# Patient Record
Sex: MALE | Race: WHITE
[De-identification: names, ages, dates, MRNs, and addresses within clinical notes are randomized per-mention and may not be internally consistent; named-entity substitution may affect disease eponyms.]

---

## 2019-03-17 ENCOUNTER — HOSPITAL ENCOUNTER (EMERGENCY)
Dept: HOSPITAL 53 - M ED | Age: 54
Discharge: HOME | End: 2019-03-17
Payer: COMMERCIAL

## 2019-03-17 VITALS — HEIGHT: 72 IN | WEIGHT: 235.5 LBS | BODY MASS INDEX: 31.9 KG/M2

## 2019-03-17 VITALS — DIASTOLIC BLOOD PRESSURE: 91 MMHG | SYSTOLIC BLOOD PRESSURE: 149 MMHG

## 2019-03-17 DIAGNOSIS — Z79.899: ICD-10-CM

## 2019-03-17 DIAGNOSIS — Z87.891: ICD-10-CM

## 2019-03-17 DIAGNOSIS — R03.0: ICD-10-CM

## 2019-03-17 DIAGNOSIS — J09.X2: Primary | ICD-10-CM

## 2019-03-17 LAB
BUN SERPL-MCNC: 13 MG/DL (ref 7–18)
CALCIUM SERPL-MCNC: 8.2 MG/DL (ref 8.5–10.1)
CHLORIDE SERPL-SCNC: 107 MEQ/L (ref 98–107)
CO2 SERPL-SCNC: 28 MEQ/L (ref 21–32)
CREAT SERPL-MCNC: 0.77 MG/DL (ref 0.7–1.3)
FLUAV RNA UPPER RESP QL NAA+PROBE: POSITIVE
FLUBV RNA UPPER RESP QL NAA+PROBE: NEGATIVE
GFR SERPL CREATININE-BSD FRML MDRD: > 60 ML/MIN/{1.73_M2} (ref 56–?)
GLUCOSE SERPL-MCNC: 89 MG/DL (ref 70–100)
POTASSIUM SERPL-SCNC: 4.3 MEQ/L (ref 3.5–5.1)
SODIUM SERPL-SCNC: 140 MEQ/L (ref 136–145)

## 2019-03-18 NOTE — REP
UNILATERAL RIGHT RIBS, PA CHEST, FIVE VIEWS:

 

HISTORY:  Rib pain.

 

The lungs are clear.  The heart is normal in size.  The pulmonary vasculature is

normal in appearance.  The bony structure is intact.

 

IMPRESSION:

 

No acute disease.

 

 

Electronically Signed by

Srikanth Holland MD 03/18/2019 07:26 A

## 2021-05-11 ENCOUNTER — HOSPITAL ENCOUNTER (OUTPATIENT)
Dept: HOSPITAL 53 - M PLARAD | Age: 56
End: 2021-05-11
Attending: ORTHOPAEDIC SURGERY
Payer: COMMERCIAL

## 2021-05-11 DIAGNOSIS — M25.561: Primary | ICD-10-CM

## 2021-05-11 NOTE — REP
INDICATION:

PAIN IN RIGHT KNEE.



COMPARISON:

None.



TECHNIQUE:

Multiple sequences obtained in the axial, coronal and sagittal planes.



FINDINGS:

Menisci: Intact, no tear.

Cruciate ligaments: Intact.

Collateral ligaments: Intact.

Extensor mechanism/patellar retinacula: Intact.

Cartilage: There is a 1 cm cartilaginous defect of the medial patellar facet extending

close to the osseous surface.  There is mild diffuse chondromalacia in the lateral

joint compartment.  There is moderate diffuse chondromalacia of the medial joint

compartment.

Bone marrow: Diffuse marrow edema is seen in the medial femoral condyle consistent

with a bone bruise.  There is mild marrow edema medial patellar facet.

Joint fluid: There is mild to moderate joint effusion.

Popliteal region: No cyst.





IMPRESSION:

No evidence of internal derangement.  Diffuse bone bruise medial femoral condyle.

Mild to moderate joint effusion.



There is a 1 cm cartilaginous defect of the medial patellar facet extending close to

the osseous surface, with mild associated marrow edema.  There is mild diffuse

chondromalacia of the lateral joint compartment and moderate diffuse chondromalacia of

the medial joint compartment.





<Electronically signed by Jass Auguste > 05/11/21 1040

## 2022-12-22 ENCOUNTER — HOSPITAL ENCOUNTER (OUTPATIENT)
Dept: HOSPITAL 53 - M SDC | Age: 57
Discharge: HOME | End: 2022-12-22
Attending: DENTIST
Payer: COMMERCIAL

## 2022-12-22 VITALS — BODY MASS INDEX: 38.5 KG/M2 | WEIGHT: 275 LBS | HEIGHT: 71 IN

## 2022-12-22 VITALS — DIASTOLIC BLOOD PRESSURE: 84 MMHG | SYSTOLIC BLOOD PRESSURE: 140 MMHG

## 2022-12-22 DIAGNOSIS — F41.9: ICD-10-CM

## 2022-12-22 DIAGNOSIS — M54.2: ICD-10-CM

## 2022-12-22 DIAGNOSIS — F17.290: ICD-10-CM

## 2022-12-22 DIAGNOSIS — K02.9: Primary | ICD-10-CM

## 2022-12-22 DIAGNOSIS — Z79.899: ICD-10-CM

## 2022-12-22 DIAGNOSIS — I10: ICD-10-CM

## 2022-12-22 DIAGNOSIS — E78.00: ICD-10-CM

## 2022-12-22 DIAGNOSIS — M16.0: ICD-10-CM

## 2022-12-22 DIAGNOSIS — Z79.1: ICD-10-CM

## 2022-12-22 DIAGNOSIS — J45.909: ICD-10-CM

## 2022-12-22 DIAGNOSIS — J44.9: ICD-10-CM

## 2022-12-22 PROCEDURE — 88300 SURGICAL PATH GROSS: CPT
